# Patient Record
Sex: FEMALE | Race: BLACK OR AFRICAN AMERICAN | NOT HISPANIC OR LATINO | URBAN - METROPOLITAN AREA
[De-identification: names, ages, dates, MRNs, and addresses within clinical notes are randomized per-mention and may not be internally consistent; named-entity substitution may affect disease eponyms.]

---

## 2017-02-13 ENCOUNTER — OUTPATIENT (OUTPATIENT)
Dept: OUTPATIENT SERVICES | Facility: HOSPITAL | Age: 52
LOS: 1 days | End: 2017-02-13

## 2017-02-16 DIAGNOSIS — Z01.20 ENCOUNTER FOR DENTAL EXAMINATION AND CLEANING WITHOUT ABNORMAL FINDINGS: ICD-10-CM

## 2017-05-02 ENCOUNTER — EMERGENCY (EMERGENCY)
Facility: HOSPITAL | Age: 52
LOS: 1 days | Discharge: HOME | End: 2017-05-02
Attending: EMERGENCY MEDICINE | Admitting: EMERGENCY MEDICINE
Payer: MEDICAID

## 2017-05-02 VITALS
TEMPERATURE: 98 F | OXYGEN SATURATION: 100 % | RESPIRATION RATE: 16 BRPM | SYSTOLIC BLOOD PRESSURE: 104 MMHG | HEART RATE: 75 BPM | DIASTOLIC BLOOD PRESSURE: 72 MMHG

## 2017-05-02 PROCEDURE — 99283 EMERGENCY DEPT VISIT LOW MDM: CPT | Mod: 25

## 2017-05-02 NOTE — ED ADULT TRIAGE NOTE - CHIEF COMPLAINT QUOTE
C/o bleeding from right ear starting last night now with difficulty hearing from ear. Pt states she was itching ear when it started. States "pillow was covered with blood". Also c/o headache. Dried blood around right ear noted.

## 2017-05-03 RX ORDER — OFLOXACIN OTIC SOLUTION 3 MG/ML
1 SOLUTION/ DROPS AURICULAR (OTIC)
Qty: 1 | Refills: 0 | OUTPATIENT
Start: 2017-05-03

## 2017-05-03 RX ORDER — CIPROFLOXACIN 6 MG/ML
1 SUSPENSION INTRATYMPANIC
Qty: 1 | Refills: 0 | OUTPATIENT
Start: 2017-05-03 | End: 2017-05-10

## 2017-05-03 NOTE — ED POST DISCHARGE NOTE - RESULT SUMMARY
I rec'd a call from [facility], [facility], regarding cipro otic Rx; pharmacist states not covered by pt's insurance.  I reviewed the ED Provider Note; pt Rx'd med for traumatic TM perforation.  Per [facility] pharmacist, ofloxacin otic gtts are covered.  I reviewed prescribing info for ofloxacin otic; has off-label indication for TM perf: 2 - 3 gtts to affected ear once daily until perforation resolves (UpToDate reference).  eRx sent for ofloxacin otic.

## 2017-05-03 NOTE — ED PROVIDER NOTE - NS ED MD SCRIBE ATTENDING SCRIBE SECTIONS
PHYSICAL EXAM/PAST MEDICAL/SURGICAL/SOCIAL HISTORY/HIV/HISTORY OF PRESENT ILLNESS/VITAL SIGNS( Pullset)/DISPOSITION/REVIEW OF SYSTEMS

## 2017-05-03 NOTE — ED PROVIDER NOTE - MEDICAL DECISION MAKING DETAILS
pt with ruptured TM.  No active bleeding.  Unknown etiology.  Will treat with ABx gtt and ENT follow up

## 2017-05-03 NOTE — ED PROVIDER NOTE - OBJECTIVE STATEMENT
50yo F with no significant PMHx, presents to ED with c/o right sided ear bleed, and HA that started last night. Pt does not know the cause of the ear bleed. Pt states she was itching ear when it started. Able to hear but describes it as "heavy". Denies f/c. NKDA.

## 2019-07-22 ENCOUNTER — EMERGENCY (EMERGENCY)
Facility: HOSPITAL | Age: 54
LOS: 1 days | Discharge: HOME | End: 2019-07-22
Attending: STUDENT IN AN ORGANIZED HEALTH CARE EDUCATION/TRAINING PROGRAM
Payer: MEDICAID

## 2019-07-22 VITALS
TEMPERATURE: 98 F | HEART RATE: 69 BPM | HEIGHT: 64 IN | RESPIRATION RATE: 18 BRPM | DIASTOLIC BLOOD PRESSURE: 81 MMHG | OXYGEN SATURATION: 97 % | SYSTOLIC BLOOD PRESSURE: 120 MMHG | WEIGHT: 162.92 LBS

## 2019-07-22 PROCEDURE — 99283 EMERGENCY DEPT VISIT LOW MDM: CPT | Mod: 25

## 2019-07-22 PROCEDURE — 90471 IMMUNIZATION ADMIN: CPT

## 2019-07-22 PROCEDURE — 99282 EMERGENCY DEPT VISIT SF MDM: CPT

## 2019-07-22 PROCEDURE — 90715 TDAP VACCINE 7 YRS/> IM: CPT

## 2019-07-22 RX ORDER — TETANUS TOXOID, REDUCED DIPHTHERIA TOXOID AND ACELLULAR PERTUSSIS VACCINE, ADSORBED 5; 2.5; 8; 8; 2.5 [IU]/.5ML; [IU]/.5ML; UG/.5ML; UG/.5ML; UG/.5ML
0.5 SUSPENSION INTRAMUSCULAR ONCE
Refills: 0 | Status: COMPLETED | OUTPATIENT
Start: 2019-07-22 | End: 2019-07-22

## 2019-07-22 RX ADMIN — TETANUS TOXOID, REDUCED DIPHTHERIA TOXOID AND ACELLULAR PERTUSSIS VACCINE, ADSORBED 0.5 MILLILITER(S): 5; 2.5; 8; 8; 2.5 SUSPENSION INTRAMUSCULAR at 16:20

## 2019-07-22 NOTE — ED PROVIDER NOTE - OBJECTIVE STATEMENT
53 year-old female, presents with request to get pertussis vaccine as part of her job requirement. No complaints voiced.

## 2019-07-22 NOTE — ED PROVIDER NOTE - PROGRESS NOTE DETAILS
Adacel vaccine given. Will dc home. Pt is well appearing walking with steady gait, stable for discharge and follow up without fail with medical doctor. I had a detailed discussion with the patient and/or guardian regarding the historical points, exam findings, and any diagnostic results supporting the discharge diagnosis. Pt educated on care and need for follow up. Strict return instructions and red flag signs and symptoms discussed with patient. Questions answered. Pt shows understanding of discharge information and agrees to follow.

## 2019-07-22 NOTE — ED ADULT NURSE NOTE - NSIMPLEMENTINTERV_GEN_ALL_ED
Implemented All Universal Safety Interventions:  [place] to call system. Call bell, personal items and telephone within reach. Instruct patient to call for assistance. Room bathroom lighting operational. Non-slip footwear when patient is off stretcher. Physically safe environment: no spills, clutter or unnecessary equipment. Stretcher in lowest position, wheels locked, appropriate side rails in place.

## 2023-04-25 NOTE — ED ADULT TRIAGE NOTE - BP NONINVASIVE DIASTOLIC (MM HG)
81 Upper Range (In Mg/Kg): 150 Myalgia Monitoring: I explained this is common when taking isotretinoin. If this worsens they will contact us. Retinoid Dermatitis Aggressive Treatment: I recommended more frequent application of Cetaphil or CeraVe to the areas of dermatitis. I also prescribed a topical steroid for twice daily use until the dermatitis resolves. Female Completion Statement: After discussing her treatment course we decided to discontinue isotretinoin therapy at this time. I explained that she would need to continue her birth control methods for at least one month after the last dosage. She should also get a pregnancy test one month after the last dose. She shouldn't donate blood for one month after the last dose. She should call with any new symptoms of depression. Male Completion Statement: After discussing his treatment course we decided to discontinue isotretinoin therapy at this time. He shouldn't donate blood for one month after the last dose. He should call with any new symptoms of depression. Target Cumulative Dosage (In Mg/Kg): 135 Months Of Therapy Completed: 2 Xerosis Aggressive Treatment: I recommended application of Cetaphil or CeraVe numerous times a day going to bed to all dry areas. I also prescribed a topical steroid for twice daily use. Hypercholesterolemia Monitoring: I explained this is common when taking isotretinoin. We will monitor closely. Detail Level: Zone Myalgia Treatment: I explained this is common when taking isotretinoin. If this worsens they will contact us. They may try OTC ibuprofen. Cheilitis Normal Treatment: I recommended application of Vaseline or Aquaphor numerous times a day (as often as every hour) and before going to bed. Show Text Field For Brand Names Of Contraception?: Yes Use Enhanced Counseling Feature (Automatic): No Headache Monitoring: I recommended monitoring the headaches for now. There is no evidence of increased intracranial pressure. They were instructed to call if the headaches are worsening. Dosing Month 1 (Required For Cumulative Dosing): 40mg Daily What Is The Patient's Gender: Female Cheilitis Aggressive Treatment: I recommended application of Vaseline or Aquaphor numerous times a day (as often as every hour) and before going to bed. I also prescribed a topical steroid for twice daily use. Nosebleeds Normal Treatment: I explained this is common when taking isotretinoin. I recommended saline mist in each nostril multiple times a day. If this worsens they will contact us. Retinoid Dermatitis Normal Treatment: I recommended more frequent application of Cetaphil or CeraVe to the areas of dermatitis. Xerosis Normal Treatment: I recommended application of Cetaphil or CeraVe numerous times a day and before going to bed to all dry areas. Counseling Text: I reviewed the side effect in detail. Patient should get monthly blood tests, not donate blood, not drive at night if vision affected, and not share medication. Use Therapeutic Ranged Or Therapeutic Target: please select Range or Target Patient Weight (Optional But Required For Cumulative Dose-Numbers And Decimals Only): 104 Female Pregnancy Counseling Text: Female patients should also be on two forms of birth control while taking this medication and for one month after their last dose. Xerosis Aggressive Treatment: I recommended application of Cetaphil or CeraVe numerous times a day and before going to bed to all dry areas. I also prescribed a topical steroid for twice daily use. Weight Units: pounds Next Month's Dosage: Continue Current Dosage Lower Range (In Mg/Kg): 120 Pounds Preamble Statement (Weight Entered In Details Tab): Reported Weight in pounds: Kilograms Preamble Statement (Weight Entered In Details Tab): Reported Weight in kilograms: Xerosis Normal Treatment: I recommended application of Cetaphil or CeraVe numerous times a day going to bed to all dry areas.

## 2024-11-07 ENCOUNTER — EMERGENCY (EMERGENCY)
Facility: HOSPITAL | Age: 59
LOS: 1 days | Discharge: HOME | End: 2024-11-07
Admitting: EMERGENCY MEDICINE
Payer: COMMERCIAL

## 2024-11-07 VITALS
RESPIRATION RATE: 18 BRPM | SYSTOLIC BLOOD PRESSURE: 129 MMHG | WEIGHT: 164.02 LBS | HEART RATE: 70 BPM | OXYGEN SATURATION: 100 % | TEMPERATURE: 98 F | DIASTOLIC BLOOD PRESSURE: 82 MMHG

## 2024-11-07 PROCEDURE — 99283 EMERGENCY DEPT VISIT LOW MDM: CPT

## 2024-11-07 RX ORDER — ACETAMINOPHEN 500 MG
650 TABLET ORAL ONCE
Refills: 0 | Status: COMPLETED | OUTPATIENT
Start: 2024-11-07 | End: 2024-11-07

## 2024-11-07 NOTE — ED PROVIDER NOTE - NSFOLLOWUPINSTRUCTIONS_ED_ALL_ED_FT
Follow up with your PMD within 1-2 days or you can call our clinic at [phone] for an appointment  Take all of your other medications as previously prescribed.  Worsening, continued or ANY new concerning symptoms return to the Emergency Department.    Toothache    WHAT YOU NEED TO KNOW:    What is a toothache and what causes it? A toothache is pain that is caused by irritation of the nerves in the center of your tooth. The irritation may be caused by several problems, such as a cavity, an infection, a cracked tooth, or gum disease.  Tooth Anatomy    How is a toothache diagnosed? Your healthcare provider will ask how long you have had a toothache. He or she will also ask if you have any other symptoms or medical conditions. Your healthcare provider will examine your tooth and mouth. Your provider may also examine your face and neck. You may need x-rays to check for an infection or cracked tooth.    How is a toothache treated? Treatment depends on the cause of your toothache. You may need any of the following:    NSAIDs, such as ibuprofen, help decrease swelling, pain, and fever. This medicine is available with or without a doctor's order. NSAIDs can cause stomach bleeding or kidney problems in certain people. If you take blood thinner medicine, always ask if NSAIDs are safe for you. Always read the medicine label and follow directions. Do not give these medicines to children younger than 6 months without direction from a healthcare provider.    Acetaminophen decreases pain and fever. It is available without a doctor's order. Ask how much to take and how often to take it. Follow directions. Acetaminophen can cause liver damage if not taken correctly.    Prescription pain medicine may be given. Ask your healthcare provider how to take this medicine safely. Some prescription pain medicines contain acetaminophen. Do not take other medicines that contain acetaminophen without talking to your healthcare provider. Too much acetaminophen may cause liver damage. Prescription pain medicine may cause constipation. Ask your healthcare provider how to prevent or treat constipation.    Antibiotics help treat or prevent a bacterial infection.  How can I manage my toothache?    Rinse your mouth with warm salt water 4 times a day or as directed.    Eat soft foods to help relieve pain caused by chewing.    Apply ice on your jaw or cheek for 15 to 20 minutes every hour or as directed. Use an ice pack, or put crushed ice in a plastic bag. Cover it with a towel before you apply it. Ice helps prevent tissue damage and decreases swelling and pain.  How can I help prevent a toothache?    Brush your teeth at least 2 times a day.    Use dental floss to clean between your teeth at least 1 time a day.    See your dentist regularly every 6 months for dental cleanings and oral exams.  When should I seek immediate care?    You have trouble breathing or swallowing.    You have swelling in your face or neck.  When should I contact my dentist?    You have a fever and chills.    You have trouble opening or closing your mouth.    You have swelling around your tooth.    You have questions or concerns about your condition or care.
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
## 2024-11-07 NOTE — ED PROVIDER NOTE - CLINICAL SUMMARY MEDICAL DECISION MAKING FREE TEXT BOX
58-year-old female with no known past medical history presents to the ED complaining of pain to right upper tooth.  Patient with stable hemodynamics, right lateral upper incisor noted with some point tenderness, there is no gingival swelling, no extraoral and intraoral swelling, neck supple, no submandibular swelling or tenderness, dental pain clinical evidence of dental abscess or deep neck soft tissue infection at this time.  Plan to refer to dental clinic, analgesics, strict return precautions.

## 2024-11-07 NOTE — ED PROVIDER NOTE - PATIENT PORTAL LINK FT
You can access the FollowMyHealth Patient Portal offered by [facility] by registering at the following website: [email or web address]. By joining [employer]’s FollowMyHealth portal, you will also be able to view your health information using other applications (apps) compatible with our system.

## 2024-11-07 NOTE — ED ADULT TRIAGE NOTE - CHIEF COMPLAINT QUOTE
Patient came in with the complaints of Toothache. As per patient , her right upper tooth is hurting from 2 years and she was using mouth wash with relief but pain is getting worse now. No other complaints. Denies past Medical History

## 2024-11-13 ENCOUNTER — APPOINTMENT (OUTPATIENT)
Age: 59
End: 2024-11-13
Payer: SELF-PAY

## 2024-11-13 PROCEDURE — D0220: CPT

## 2024-11-13 PROCEDURE — D7140: CPT

## 2024-11-21 ENCOUNTER — APPOINTMENT (OUTPATIENT)
Age: 59
End: 2024-11-21
Payer: SELF-PAY

## 2024-11-21 PROCEDURE — D0171: CPT | Mod: NC

## 2025-02-12 ENCOUNTER — APPOINTMENT (OUTPATIENT)
Age: 60
End: 2025-02-12
Payer: SELF-PAY

## 2025-02-12 PROCEDURE — D0120: CPT

## 2025-02-12 PROCEDURE — D0210: CPT

## 2025-03-26 ENCOUNTER — APPOINTMENT (OUTPATIENT)
Age: 60
End: 2025-03-26

## 2025-04-09 ENCOUNTER — APPOINTMENT (OUTPATIENT)
Age: 60
End: 2025-04-09

## 2025-05-02 ENCOUNTER — APPOINTMENT (OUTPATIENT)
Age: 60
End: 2025-05-02
Payer: SELF-PAY

## 2025-05-02 PROCEDURE — D1110 PROPHYLAXIS - ADULT: CPT

## 2025-06-27 ENCOUNTER — APPOINTMENT (OUTPATIENT)
Age: 60
End: 2025-06-27

## 2025-07-24 ENCOUNTER — APPOINTMENT (OUTPATIENT)
Age: 60
End: 2025-07-24

## 2025-08-19 ENCOUNTER — APPOINTMENT (OUTPATIENT)
Age: 60
End: 2025-08-19
Payer: SELF-PAY

## 2025-08-19 PROCEDURE — D2331: CPT

## 2025-08-19 PROCEDURE — D2392: CPT

## 2025-08-26 ENCOUNTER — APPOINTMENT (OUTPATIENT)
Age: 60
End: 2025-08-26